# Patient Record
Sex: MALE | Race: WHITE | NOT HISPANIC OR LATINO | Employment: FULL TIME | URBAN - METROPOLITAN AREA
[De-identification: names, ages, dates, MRNs, and addresses within clinical notes are randomized per-mention and may not be internally consistent; named-entity substitution may affect disease eponyms.]

---

## 2022-07-10 ENCOUNTER — HOSPITAL ENCOUNTER (EMERGENCY)
Facility: HOSPITAL | Age: 22
Discharge: HOME | End: 2022-07-10
Attending: EMERGENCY MEDICINE | Admitting: EMERGENCY MEDICINE

## 2022-07-10 VITALS
OXYGEN SATURATION: 100 % | TEMPERATURE: 97.8 F | SYSTOLIC BLOOD PRESSURE: 137 MMHG | DIASTOLIC BLOOD PRESSURE: 79 MMHG | RESPIRATION RATE: 16 BRPM | WEIGHT: 173.94 LBS | BODY MASS INDEX: 24.35 KG/M2 | HEART RATE: 55 BPM | HEIGHT: 71 IN

## 2022-07-10 DIAGNOSIS — T16.1XXA FOREIGN BODY OF RIGHT EAR, INITIAL ENCOUNTER: Primary | ICD-10-CM

## 2022-07-10 PROCEDURE — 99282 EMERGENCY DEPT VISIT SF MDM: CPT

## 2022-07-10 RX ORDER — LIDOCAINE HYDROCHLORIDE 10 MG/ML
5 INJECTION, SOLUTION EPIDURAL; INFILTRATION; INTRACAUDAL; PERINEURAL ONCE
Status: COMPLETED | OUTPATIENT
Start: 2022-07-10 | End: 2022-07-10

## 2022-07-10 RX ADMIN — LIDOCAINE HYDROCHLORIDE 5 ML: 10 INJECTION, SOLUTION EPIDURAL; INFILTRATION; INTRACAUDAL; PERINEURAL at 11:08

## 2022-07-10 NOTE — ED PROVIDER NOTES
"Time: 10:36 AM EDT  Arrived by: private car  Chief Complaint: foreign body in right ear  History provided by: patient  History is limited by: No limitations     History of Present Illness:  Patient is a 22 y.o. year old male who presents to the emergency department with possible insect in right ear.  It is causing some discomfort but no bleeding.    Patient presents to the ED for evaluation of an insect in his right ear. He states that he was outside last night at 0100 when he believes an insect flew into his right ear. He is able to feel it moving. Patient has tried to flush his ear out without improvement.       History provided by:  Patient   used: No        Similar Symptoms Previously: no  Recently seen: no      Patient Care Team  Primary Care Provider: Provider, No Known    Past Medical History:     No Known Allergies  No past medical history on file.  No past surgical history on file.  No family history on file.    Home Medications:  Prior to Admission medications    Not on File        Social History:      Recent travel: not applicable     Review of Systems:  Review of Systems   Constitutional: Negative for chills and fever.   HENT: Positive for ear pain (insect in right ear). Negative for congestion and sore throat.    Eyes: Negative for pain.   Respiratory: Negative for cough, chest tightness and shortness of breath.    Cardiovascular: Negative for chest pain.   Gastrointestinal: Negative for abdominal pain, diarrhea, nausea and vomiting.   Genitourinary: Negative for flank pain and hematuria.   Musculoskeletal: Negative for joint swelling.   Skin: Negative for pallor.   Neurological: Negative for seizures and headaches.   All other systems reviewed and are negative.       Physical Exam:  /79 (Patient Position: Sitting)   Pulse 55   Temp 97.8 °F (36.6 °C) (Oral)   Resp 16   Ht 180.3 cm (71\")   Wt 78.9 kg (173 lb 15.1 oz)   SpO2 100%   BMI 24.26 kg/m²     Physical Exam  Vitals " and nursing note reviewed.   Constitutional:       General: He is not in acute distress.     Appearance: Normal appearance. He is not toxic-appearing.   HENT:      Head: Normocephalic and atraumatic.      Right Ear: Tympanic membrane and external ear normal.      Ears:      Comments: Right ear: small insect in the right ear canal near the TM. No TM perforation or bleeding noted.     Mosquito removed from right ear canal. Symptoms resolved.      Mouth/Throat:      Mouth: Mucous membranes are moist.   Eyes:      General: No scleral icterus.  Cardiovascular:      Rate and Rhythm: Normal rate and regular rhythm.      Pulses: Normal pulses.      Heart sounds: Normal heart sounds.   Pulmonary:      Effort: Pulmonary effort is normal. No respiratory distress.      Breath sounds: Normal breath sounds.   Abdominal:      General: Abdomen is flat.      Palpations: Abdomen is soft.      Tenderness: There is no abdominal tenderness.   Musculoskeletal:         General: Normal range of motion.      Cervical back: Normal range of motion and neck supple.   Skin:     General: Skin is warm and dry.   Neurological:      Mental Status: He is alert and oriented to person, place, and time. Mental status is at baseline.                Medications in the Emergency Department:  Medications   lidocaine PF 1% (XYLOCAINE) injection 5 mL (5 mL Other Given by Other 7/10/22 1108)        Labs  Lab Results (last 24 hours)     ** No results found for the last 24 hours. **           Imaging:  No Radiology Exams Resulted Within Past 24 Hours    Procedures:  Procedures    Progress                            Medical Decision Making:  MDM  Number of Diagnoses or Management Options  Foreign body of right ear, initial encounter  Diagnosis management comments: Reassessed at 1207 - Mosquito removed from right ear canal and patient's symptoms have resolved.        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    With otoscopy I can visualize a small insect in the right ear canal almost adjacent to the right TM, and do not see any associated bleeding or perforation of the TM.    We are first attempting to paralyze the insect with some lidocaine in the right ear canal, and following this, will irrigate the ear until the insect has dislodged.      Following our nurse irrigating out the right ear, there was dislodgment and a small mosquito was removed from the ear canal.    On my reassessment, his ear canal is normal-appearing and no further foreign bodies are seen and no injury to the TM was seen.    His symptoms have resolved and after filling out his  paperwork he looks stable to be discharged home.      Final diagnoses:   Foreign body of right ear, initial encounter        Disposition:  ED Disposition     ED Disposition   Discharge    Condition   Stable    Comment   --             This medical record created using voice recognition software.             Ricci Samuels  07/10/22 1043       Ricci Samuels  07/10/22 1210       Harman Winn MD  07/10/22 1227